# Patient Record
Sex: MALE | Race: WHITE | Employment: FULL TIME | URBAN - METROPOLITAN AREA
[De-identification: names, ages, dates, MRNs, and addresses within clinical notes are randomized per-mention and may not be internally consistent; named-entity substitution may affect disease eponyms.]

---

## 2019-03-24 ENCOUNTER — APPOINTMENT (OUTPATIENT)
Dept: CT IMAGING | Age: 24
End: 2019-03-24
Payer: COMMERCIAL

## 2019-03-24 ENCOUNTER — HOSPITAL ENCOUNTER (EMERGENCY)
Age: 24
Discharge: HOME | End: 2019-03-24
Attending: EMERGENCY MEDICINE
Payer: COMMERCIAL

## 2019-03-24 DIAGNOSIS — K40.91 RECURRENT INGUINAL HERNIA WITHOUT OBSTRUCTION OR GANGRENE, UNSPECIFIED LATERALITY: ICD-10-CM

## 2019-03-24 DIAGNOSIS — K04.7 PERIAPICAL ABSCESS WITHOUT SINUS TRACT: ICD-10-CM

## 2019-03-24 DIAGNOSIS — K02.9 DENTAL CARIES: ICD-10-CM

## 2019-03-24 DIAGNOSIS — K08.89 DENTALGIA: Primary | ICD-10-CM

## 2019-03-24 PROCEDURE — 6370000000 HC RX 637 (ALT 250 FOR IP): Performed by: EMERGENCY MEDICINE

## 2019-03-24 PROCEDURE — 6370000000 HC RX 637 (ALT 250 FOR IP): Performed by: PHYSICIAN ASSISTANT

## 2019-03-24 PROCEDURE — 99283 EMERGENCY DEPT VISIT LOW MDM: CPT

## 2019-03-24 PROCEDURE — 74176 CT ABD & PELVIS W/O CONTRAST: CPT

## 2019-03-24 RX ORDER — NAPROXEN 500 MG/1
500 TABLET ORAL 2 TIMES DAILY PRN
Qty: 20 TABLET | Refills: 0 | Status: SHIPPED | OUTPATIENT
Start: 2019-03-24 | End: 2019-04-03

## 2019-03-24 RX ORDER — HYDROCODONE BITARTRATE AND ACETAMINOPHEN 5; 325 MG/1; MG/1
2 TABLET ORAL ONCE
Status: COMPLETED | OUTPATIENT
Start: 2019-03-24 | End: 2019-03-24

## 2019-03-24 RX ORDER — NAPROXEN 250 MG/1
500 TABLET ORAL ONCE
Status: COMPLETED | OUTPATIENT
Start: 2019-03-24 | End: 2019-03-24

## 2019-03-24 RX ORDER — HYDROCODONE BITARTRATE AND ACETAMINOPHEN 5; 325 MG/1; MG/1
1 TABLET ORAL EVERY 6 HOURS PRN
Qty: 8 TABLET | Refills: 0 | Status: SHIPPED | OUTPATIENT
Start: 2019-03-24 | End: 2019-03-27

## 2019-03-24 RX ORDER — AMOXICILLIN 500 MG/1
500 CAPSULE ORAL 3 TIMES DAILY
Qty: 30 CAPSULE | Refills: 0 | Status: SHIPPED | OUTPATIENT
Start: 2019-03-24 | End: 2019-04-03

## 2019-03-24 RX ADMIN — NAPROXEN 500 MG: 250 TABLET ORAL at 15:10

## 2019-03-24 RX ADMIN — HYDROCODONE BITARTRATE AND ACETAMINOPHEN 2 TABLET: 5; 325 TABLET ORAL at 15:10

## 2019-03-24 ASSESSMENT — ENCOUNTER SYMPTOMS
SHORTNESS OF BREATH: 0
COLOR CHANGE: 0
BACK PAIN: 0
CHEST TIGHTNESS: 0
DIARRHEA: 0
ABDOMINAL PAIN: 0
VOMITING: 0
NAUSEA: 0

## 2019-03-24 ASSESSMENT — PAIN SCALES - GENERAL: PAINLEVEL_OUTOF10: 10

## 2019-03-25 VITALS
WEIGHT: 150 LBS | HEART RATE: 69 BPM | DIASTOLIC BLOOD PRESSURE: 87 MMHG | TEMPERATURE: 97.8 F | RESPIRATION RATE: 14 BRPM | OXYGEN SATURATION: 98 % | BODY MASS INDEX: 19.88 KG/M2 | SYSTOLIC BLOOD PRESSURE: 123 MMHG | HEIGHT: 73 IN

## 2019-05-17 ENCOUNTER — HOSPITAL ENCOUNTER (EMERGENCY)
Age: 24
Discharge: HOME | End: 2019-05-17
Payer: COMMERCIAL

## 2019-05-17 VITALS
TEMPERATURE: 98.1 F | HEART RATE: 52 BPM | DIASTOLIC BLOOD PRESSURE: 93 MMHG | WEIGHT: 140 LBS | RESPIRATION RATE: 16 BRPM | SYSTOLIC BLOOD PRESSURE: 149 MMHG | HEIGHT: 73 IN | OXYGEN SATURATION: 96 % | BODY MASS INDEX: 18.55 KG/M2

## 2019-05-17 DIAGNOSIS — L25.5 CONTACT DERMATITIS DUE TO PLANT: Primary | ICD-10-CM

## 2019-05-17 PROCEDURE — 96374 THER/PROPH/DIAG INJ IV PUSH: CPT

## 2019-05-17 PROCEDURE — 6360000002 HC RX W HCPCS: Performed by: PHYSICIAN ASSISTANT

## 2019-05-17 PROCEDURE — 99282 EMERGENCY DEPT VISIT SF MDM: CPT

## 2019-05-17 RX ORDER — PREDNISONE 10 MG/1
60 TABLET ORAL DAILY
Qty: 30 TABLET | Refills: 0 | Status: SHIPPED | OUTPATIENT
Start: 2019-05-17 | End: 2019-05-22

## 2019-05-17 RX ORDER — DEXAMETHASONE SODIUM PHOSPHATE 10 MG/ML
6 INJECTION, SOLUTION INTRAMUSCULAR; INTRAVENOUS ONCE
Status: COMPLETED | OUTPATIENT
Start: 2019-05-17 | End: 2019-05-17

## 2019-05-17 RX ORDER — DIPHENHYDRAMINE HCL 25 MG
25 CAPSULE ORAL EVERY 4 HOURS PRN
Qty: 20 CAPSULE | Refills: 0 | Status: SHIPPED | OUTPATIENT
Start: 2019-05-17

## 2019-05-17 RX ADMIN — DEXAMETHASONE SODIUM PHOSPHATE 6 MG: 10 INJECTION, SOLUTION INTRAMUSCULAR; INTRAVENOUS at 23:40

## 2019-05-18 ASSESSMENT — ENCOUNTER SYMPTOMS
DIARRHEA: 0
NAUSEA: 0
WHEEZING: 0
SHORTNESS OF BREATH: 0
PHOTOPHOBIA: 0
EYE PAIN: 0
VOMITING: 0
EYE ITCHING: 0
STRIDOR: 0
ABDOMINAL DISTENTION: 0
EYE DISCHARGE: 0
COUGH: 0
SORE THROAT: 0
BACK PAIN: 0
TROUBLE SWALLOWING: 0
EYE REDNESS: 0
ABDOMINAL PAIN: 0
COLOR CHANGE: 0
CONSTIPATION: 0
RECTAL PAIN: 0

## 2019-05-18 NOTE — ED PROVIDER NOTES
**EVALUATED BY ADVANCED PRACTICE PROVIDER**        [address] ENCOUNTER      Pt Name: Ashley Carter  QFV:[id number]  Robgfcarl 1995  Date of evaluation: 5/17/2019  Provider: Amber Varela PA-C      Chief Complaint:    Chief Complaint   Patient presents with    Rash     pt works outside doing tree services, rash on bilat UE and bilat LE, face and abd tried calamine lotion w/o relief       Nursing Notes, Past Medical Hx, Past Surgical Hx, Social Hx, Allergies, and Family Hx were all reviewed and agreed with or any disagreements were addressed in the HPI.    HPI:  (Location, Duration, Timing, Severity,Quality, Assoc Sx, Context, Modifying factors)  This is a  21 y.o. male who presents to the emergency department complaining of itchy rash on his arms, lower legs, a little bit on his abdomen and around both of his eyes for several days. 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 He has been trying calamine lotion but states that the rash is still spreading. Denies any significant pain but describes the rash as uncomfortable and burning. Denies any drainage, fever, chills, sore throat, cough, congestion, dizziness or lightheadedness. PastMedical/Surgical History:  History reviewed. No pertinent past medical history. Procedure Laterality Date    FRACTURE SURGERY      HAND SURGERY      PELVIC FRACTURE SURGERY         Medications:  Discharge Medication List as of 5/17/2019 11:43 PM      CONTINUE these medications which have NOT CHANGED    Details   naproxen (NAPROSYN) 500 MG tablet Take 1 tablet by mouth 2 times daily as needed for Pain, Disp-20 tablet, R-0Print               Review of Systems:  Review of Systems   Constitutional: Negative for chills, diaphoresis, fatigue and fever. HENT: Negative. Negative for sore throat and trouble swallowing.     Eyes: Negative for photophobia, pain, discharge, redness, than 2 seconds. Rash (both forearms, lower legs, small area on the abdomen, and minimally around the eyes and cheeks on face.) noted. No burn, no ecchymosis, no laceration, no petechiae and no purpura noted. Rash is maculopapular and urticarial. Rash is not nodular and not pustular. He is not diaphoretic. No cyanosis or erythema. No pallor. Nails show no clubbing. No palmar or plantar lesions  No target lesions  No fluctuance or crepitus or red streaking. Psychiatric: He has a normal mood and affect. His behavior is normal.   Nursing note and vitals reviewed. MEDICAL DECISION MAKING    Vitals:    Vitals:    05/17/19 2152   BP: (!) 149/93   Pulse: 52   Resp: 16   Temp: 98.1 °F (36.7 °C)   SpO2: 96%   Weight: 140 lb (63.5 kg)   Height: 6' 1\" (1.854 m)       LABS:Labs Reviewed - No data to display     Remainder of labs reviewed and werenegative at this time or not returned at the time of this note. RADIOLOGY:   Non-plain film images such as CT, Ultrasound and MRI are read by the radiologist. Nick Chau PA-C have directly visualized the radiologic plain film image(s) with the below findings:        Interpretation per the Radiologist below, if available at the time of thisnote:    No orders to display        No results found. MEDICAL DECISION MAKING / ED COURSE:      PROCEDURES:   Procedures    None    Patient was given:  Medications   dexamethasone (PF) (DECADRON) injection 6 mg (6 mg Intramuscular Given 5/17/19 2340)       This patient presents to the emergency department after contact dermatitis secondary to exposure to poison oak. He is advised to continue using calamine lotion. I will add steroid and antihistamine to the regimen. He tolerated steroid injection here well without immediate complications or emesis.   My suspicion is low for syphilis, bullous pemphigoid, pemphigus vulgaris, tinea, life threatening skin infection SJS, EM, TEN, meningococcemia, Kawasaki disease, abscess, cellulitis,  lymphangitis, lymphadenitis, RMSF, vasculitis, Lyme disease, HSP, angioedema, anaphylaxis, malar rash, scalded skin syndrome, or other concerning pathology. He is advised to follow up with PCP for recheck and may return ED per discharge instructions. We have addressed concerns and expectations. The patient tolerated their visit well. I evaluated the patient. The physician was available for consultation as needed. The patient and / or the family were informed of the results of anytests, a time was given to answer questions, a plan was proposed and they agreed with plan. CLINICAL IMPRESSION:  1.  Contact dermatitis due to plant        DISPOSITION Decision To Discharge 05/17/2019 11:30:31 PM      PATIENT REFERRED TO:  [facility]) Pre-Services  [phone]          DISCHARGE MEDICATIONS:  Discharge Medication List as of 5/17/2019 11:43 PM      START taking these medications    Details   predniSONE (DELTASONE) 10 MG tablet Take 6 tablets by mouth daily for 5 doses, Disp-30 tablet, R-0Print      diphenhydrAMINE (BENADRYL) 25 MG capsule Take 1 capsule by mouth every 4 hours as needed for Itching, Disp-20 capsule, R-0Print             DISCONTINUED MEDICATIONS:  Discharge Medication List as of 5/17/2019 11:43 PM                 (Please note the MDM and HPI sections of this note were completed with a voice recognition program.  Efforts weremade to edit the dictations but occasionally words are mis-transcribed.)    Electronically signed, Merced Wong PA-C,          Merced Wong PA-C  05/18/19 1730